# Patient Record
Sex: MALE | Race: WHITE
[De-identification: names, ages, dates, MRNs, and addresses within clinical notes are randomized per-mention and may not be internally consistent; named-entity substitution may affect disease eponyms.]

---

## 2018-03-29 ENCOUNTER — HOSPITAL ENCOUNTER (OUTPATIENT)
Dept: HOSPITAL 52 - LL.SDS | Age: 48
Discharge: HOME | End: 2018-03-29
Attending: SURGERY
Payer: COMMERCIAL

## 2018-03-29 DIAGNOSIS — Z79.899: ICD-10-CM

## 2018-03-29 DIAGNOSIS — K42.9: Primary | ICD-10-CM

## 2018-03-29 PROCEDURE — 49585: CPT

## 2018-03-29 NOTE — PCM.PN
- General Info


Date of Service: 03/29/18





- Review of Systems


Systems Review Comment:: 





48 y/o male here for repair of recently developed umbilical hernia. He is 

medically stable to proceed today. He has noticed a slight recent increase in 

the size of his hernia but has otherwise had no other significant recent 

changes to his medical status. I have confirmed the location of the hernia with 

the patient and marked it. I have again reviewed the proposed operative 

procedure with the patient. Expectations and restrictions reviewed. He appears 

to understand and agrees to proceed.





- Patient Data


Vitals - Most Recent: 


 Last Vital Signs











Temp  98.4 F   03/29/18 08:25


 


Pulse  72   03/29/18 08:25


 


Resp  20   03/29/18 08:25


 


BP  132/73   03/29/18 08:25


 


Pulse Ox  98   03/29/18 08:25











Weight - Most Recent: 108.862 kg


Med Orders - Current: 


 Current Medications





Lactated Ringer's (Ringers, Lactated)  1,000 mls @ 125 mls/hr IV ASDIRECTED ISAEL


   Last Admin: 03/29/18 08:40 Dose:  125 mls/hr


Sodium Chloride (Saline Flush)  10 ml FLUSH ASDIRECTED PRN


   PRN Reason: Keep Vein Open





Discontinued Medications





Fentanyl (Sublimaze) Confirm Administered Dose 100 mcg .ROUTE .STK-MED ONE


   Stop: 03/29/18 08:25


Midazolam HCl (Versed 1 Mg/Ml) Confirm Administered Dose 4 mg .ROUTE .STK-MED 

ONE


   Stop: 03/29/18 08:26


Propofol (Diprivan  20 Ml) Confirm Administered Dose 600 mg .ROUTE .STK-MED ONE


   Stop: 03/29/18 08:26











- Problem List Review


Problem List Initiated/Reviewed/Updated: Yes





- Assessment


Assessment:: 





Umbilical hernia





- Plan


Plan:: 





Umbilical hernia repair

## 2018-03-29 NOTE — OR
Date of Procedure:  03/29/2018

 

PREOPERATIVE DIAGNOSIS:  Umbilical hernia.

 

POSTOPERATIVE DIAGNOSIS:  Umbilical hernia.

 

OPERATION PERFORMED:  Umbilical herniorrhaphy.

 

INDICATIONS FOR SURGERY:  This 47-year-old male has recently developed a bulge

at the level of his umbilicus.  This has been enlarging.  Findings are

consistent with an umbilical hernia and he comes for an elective repair.

 

FINDINGS:  The patient had a moderate-sized umbilical hernia.  The hernia sac

does contain some preperitoneal fat, but no bowel was involved in the hernia

itself.  The surrounding fascia appears of good quality.

 

DESCRIPTION OF PROCEDURE:  The patient was taken to the operating room.  He was

given intravenous sedation.  The abdomen was sterilely prepped with Betadine and

draped.  The periumbilical region was infiltrated with Xylocaine and Marcaine

mix.  A curvilinear incision was made along the inferior aspect of the

umbilicus.  Dissection proceeded down into the subcutaneous space, where the

umbilical hernia sac was identified.  This hernia sac was  from the

surrounding subcutaneous tissue and also dissected off the overlying skin of the

umbilicus, preserving the skin uninjured.  Once the fascia had been exposed

circumferentially around the margin of the hernia, the hernia sac was then

divided circumferentially with cautery at its junction with the fascial edge.

The hernia sac was then amputated above clamps with ties of 3-0 Vicryl used

for hemostasis on the vascular pedicles.  The fascial edges were trimmed to

present a good surface for approximation.  The hernia defect was then closed by

approximating the fascial edges with interrupted #1 Prolene in a Smead-Bush

suturing technique in a transverse orientation.  This created secure and

complete closure of the hernia defect.  The wound was then irrigated.  The skin

of the umbilicus was reattached to the underlying fascia with interrupted 3-0

Vicryl.  The subcutaneous tissue was re-approximated with interrupted 4-0

Vicryl and the skin was closed with a running 4-0 Vicryl subcuticular stitch.

Steri-Strips and benzoin were applied.  Antibiotic ointment and sterile dressing

were placed.  The patient was then taken from the operating room in satisfactory

condition.

 

ESTIMATED BLOOD LOSS:  20 mL.

 

COMPLICATIONS:  None.

 

PROGNOSIS:  Good.

 

TAYLOR Jacinto MD

DD:  03/29/2018 10:34:58

DT:  03/29/2018 12:36:23

Job #:  [id number]

MTDD

## 2018-03-29 NOTE — PCM.OPNOTE
- General Post-Op/Procedure Note


Date of Surgery/Procedure: 03/29/18


Operative Procedure(s): Repair of umbilical hernia


Findings: 





Moderate-sized umbilical hernia containing preperitoneal fat


Pre Op Diagnosis: Umbilical hernia


Post-Op Diagnosis: Same


Anesthesia Technique: Local, MAC


Primary Surgeon: Russell Jacinto


Pathology: 





Hernia sac and contents


Output, Urine Amount: 0


EBL in mLs: 20


Complications: None


Condition: Good


Free Text/Narrative:: 


 Intake & Output











 03/28/18 03/29/18 03/29/18





 22:59 06:59 14:59


 


Intake Total   1000


 


Balance   1000

## 2022-12-20 ENCOUNTER — HOSPITAL ENCOUNTER (OUTPATIENT)
Dept: HOSPITAL 7 - FB.SDS | Age: 52
Discharge: HOME | End: 2022-12-20
Payer: COMMERCIAL

## 2022-12-20 DIAGNOSIS — Z79.899: ICD-10-CM

## 2022-12-20 DIAGNOSIS — Z91.048: ICD-10-CM

## 2022-12-20 DIAGNOSIS — I10: ICD-10-CM

## 2022-12-20 DIAGNOSIS — Z12.11: Primary | ICD-10-CM

## 2022-12-20 DIAGNOSIS — Z80.0: ICD-10-CM

## 2022-12-20 DIAGNOSIS — I48.91: ICD-10-CM
